# Patient Record
(demographics unavailable — no encounter records)

---

## 2025-05-09 NOTE — HISTORY OF PRESENT ILLNESS
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  The patient has difficulty with short-term memory.  He is able to get groom, cleaned it by himself.  He does go outside by himself without getting lost.  He does not do the bills, his wife does, that has always been the case.  He has difficulty with short-term memory.  Additionally, the patient has difficulty sleeping, it appears that he has poor sleep hygiene, he also does not sleep throughout the evening and sleeps several hours a night.  It is unclear if he has symptoms of a sleep apnea.

## 2025-05-09 NOTE — PHYSICAL EXAM
[Total Score ___ / 30] : the patient achieved a score of [unfilled] /30 [Date / Time ___ / 5] : date / time [unfilled] / 5 [Place ___ / 5] : place [unfilled] / 5 [Registration ___ / 3] : registration [unfilled] / 3 [Serial Sevens ___/5] : serial sevens [unfilled] / 5 [Naming 2 Objects ___ / 2] : naming two objects [unfilled] / 2 [Repeating a Sentence ___ / 1] : repeating a sentence [unfilled] / 1 [Writing a Sentence ___ / 1] : write sentence [unfilled] / 1 [3-stage Verbal Command ___ / 3] : three-stage verbal command [unfilled] / 3 [Written Command ___ / 1] : written command [unfilled] / 1 [Copy a Design ___ / 1] : copy a design [unfilled] / 1 [Recall ___ / 3] : recall [unfilled] / 3 [Cranial Nerves Optic (II)] : visual acuity intact bilaterally,  visual fields full to confrontation, pupils equal round and reactive to light [Cranial Nerves Oculomotor (III)] : extraocular motion intact [Cranial Nerves Trigeminal (V)] : facial sensation intact symmetrically [Cranial Nerves Facial (VII)] : face symmetrical [Cranial Nerves Vestibulocochlear (VIII)] : hearing was intact bilaterally [Cranial Nerves Glossopharyngeal (IX)] : tongue and palate midline [Cranial Nerves Accessory (XI - Cranial And Spinal)] : head turning and shoulder shrug symmetric [Cranial Nerves Hypoglossal (XII)] : there was no tongue deviation with protrusion [Motor Tone] : muscle tone was normal in all four extremities [Motor Strength] : muscle strength was normal in all four extremities [Involuntary Movements] : no involuntary movements were seen [Sensation Tactile Decrease] : light touch was intact [Coordination - Dysmetria Impaired Finger-to-Nose Bilateral] : not present [Coordination - Dysmetria Impaired Heel-to-Shin Bilateral] : not present [FreeTextEntry4] : Mini-Mental status examination is 19 out of 30, patient completed third grade schooling with correcting Mini-Mental status examination score of 20 out of 30 [FreeTextEntry8] : Antalgic gait

## 2025-05-09 NOTE — CONSULT LETTER
[Dear  ___] : Dear  [unfilled], [Consult Letter:] : I had the pleasure of evaluating your patient, [unfilled]. [Please see my note below.] : Please see my note below. [Consult Closing:] : Thank you very much for allowing me to participate in the care of this patient.  If you have any questions, please do not hesitate to contact me. [Sincerely,] : Sincerely, [FreeTextEntry3] : Rachael Chavarria MD, MPH

## 2025-05-09 NOTE — ASSESSMENT
[FreeTextEntry1] : aneurysms s/p SAH c/o memory problems Mini-Mental status examination is 19 out of 30, patient completed third grade schooling with correcting Mini-Mental status examination score of 20 out of 30, consistent with mild dementia range, however patient is losing significant points his attention which may be affecting multiple reasons including his sleep.  Will refer patient to sleep specialist for further evaluation.  Additionally will obtain an MRI of the brain and labs for reversible causes of dementia for further evaluation.  Patient is also complaining of balance problems, will refer to physiatry for evaluation.  I spent the time noted on the day of this patient encounter preparing for, providing and documenting the above E/M service and counseling and educate patient on differential, workup, disease course, and treatment/management. Education was provided to the patient during this encounter. All questions and concerns were answered and addressed in detail. The patient verbalized understanding and agreed to plan. Patient was advised to continue to monitor for neurologic symptoms and to notify my office or go to the nearest emergency room if there are any changes. Any orders/referrals and communications were provided as well.   Side effects of the above medications were discussed in detail including but not limited to applicable black box warning and teratogenicity as appropriate.  For patients with seizures, I discussed risk of SUDEP with patient and advised that SUDEP risk can be minimized with good seizure control through medication compliance and other measures. Patient was advised to bring previous records to my office, including CD of imaging, when applicable.

## 2025-06-04 NOTE — HISTORY OF PRESENT ILLNESS
[Home] : at home, [unfilled] , at the time of the visit. [Medical Office: ([facility])___] : at the medical office located in  [Telephone (audio)] : This telephonic visit was provided via audio only technology. [Verbal consent obtained from patient] : the patient, [unfilled] [FreeTextEntry1] : The patient has aneurysm s/o SAH s/p surgery (details unknown) 2019 and here for memory problems which started after the patient had evacuation of the subarachnoid hemorrhage. The patient has difficulty with short-term memory. He is able to get groom, cleaned it by himself. He does go outside by himself without getting lost. He does not do the bills, his wife does, that has always been the case. He has difficulty with short-term memory.  Additionally, the patient has difficulty sleeping, it appears that he has poor sleep hygiene, he also does not sleep throughout the evening and sleeps several hours a night. It is unclear if he has symptoms of a sleep apnea.  No change since last visit.

## 2025-06-04 NOTE — ASSESSMENT
[FreeTextEntry1] : aneurysms s/p SAH c/o memory problems Mini-Mental status examination is 19 out of 30, patient completed third grade schooling with correcting Mini-Mental status examination score of 20 out of 30, consistent with mild dementia range, however patient is losing significant points his attention which may be affecting multiple reasons including his sleep. Referred patient to sleep specialist for further evaluation.  MRI/A of the brain aND mrA neck shows  1.  RIGHT CAROTID NECK CIRCULATION:   Atherosclerotic plaque carotid bulb without hemodynamically significant stenosis. 2.  LEFT CAROTID NECK CIRCULATION:    Intact. 3.  VERTEBRAL NECK CIRCULATION:   Intact 4.  ANTERIOR INTRACRANIAL CIRCULATION:     Tiny saccular aneurysm has developed at the left internal carotid artery since 2019, measuring 0.4 x 0.2 cm. 5.  POSTERIOR INTRACRANIAL CIRCULATION:   Intact. 6.  BRAIN:    No evidence of acute infarction.   Right thalamic remote parenchymal hemorrhage. Ischemic white matter disease and atrophy typical for age. No recent intracranial hemorrhage is appreciated.  aneurysm, new, will refer patient to vascular neurosurgeon for further evaluation.  Advised patient and his wife the importance of observing any new severe headaches or change in mental status and neurological episodes or symptoms as this may be a sign of an aneurysm bleed and may require stat evaluation in the emergency room.  Unremarkable labs for reversible causes of dementia  Patient is also complaining of balance problems, referred pt to physiatry for evaluation.  I spent the time noted on the day of this patient encounter preparing for, providing and documenting the above E/M service and counseling and educate patient on differential, workup, disease course, and treatment/management. Education was provided to the patient during this encounter. All questions and concerns were answered and addressed in detail. The patient verbalized understanding and agreed to plan. Patient was advised to continue to monitor for neurologic symptoms and to notify my office or go to the nearest emergency room if there are any changes. Any orders/referrals and communications were provided as well.  Side effects of the above medications were discussed in detail including but not limited to applicable black box warning and teratogenicity as appropriate. For patients with seizures, I discussed risk of SUDEP with patient and advised that SUDEP risk can be minimized with good seizure control through medication compliance and other measures. Patient was advised to bring previous records to my office, including CD of imaging, when applicable.

## 2025-06-18 NOTE — HISTORY OF PRESENT ILLNESS
[FreeTextEntry1] : New cerebral aneurysm [de-identified] : Mr. Roberts is a 79M with a history of subdural hygroma s/p evacuation with bilateral blake holes at [facility] in 2019. He recently saw Dr. Chavarria for memory issues.  The patient has difficulty with short-term memory. He is able to complete the majority of his ADLS. Currently living with his wife. He had and MRI / A done which demonstrated a new left ICA aneurysm and mild ventriculomegaly. He also notes significant urinary incontinence, frequent urination as well as significant gait disturbance. He has difficulty getting up and initiating gait as well as difficulty with balance.   6/18/25 Today he is feeling well with no new complaints.  History - 2019 SDH hygromas, [facility] he had an operation done for the hygromas because the headache, dizziness didn't improve, bilateral blake holes for drainage of the subdural hematoma. Symptoms had gotten worse. Since that time worsening of memory issues.   Hx - HTN, diabetes, cholesterol

## 2025-06-18 NOTE — ASSESSMENT
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 Mild ventriculomegaly with Irwin index of roughly 0.3. He does complain of significant gait disturbance, urinary changes, and cognitive decline all on a similar time scale. Will discuss the case with Dr. Chavarria but may be reasonable to pursue further testing for NPH. Will follow up after discussion with Dr. Chavarria.   Brain aneurysm - MRA with vessel wall imaging - Discuss case at Cerebrovascular conference - Despite his age he is active and is medically stable, given this is a growin aneurysm there is a signifcaint concern as growing aneurysms have a 10-12 fold increase risk of rupture. May consider angiogram and possible treatment planning pending discussion at CV conference Discussed risks of aneurysm rupture as well as signs and symptoms of aneurysm rupture at length. Spoke with patient about going immediately to the emergency department if they were to feel worst headache of their life, severe headache, spontaneous nausea / vomiting associated with headache, weakness, LOC, or seizure. Discussed risk factors for aneurysms including family history, prior history, smoking, associated medical co-morbidities, and hypertension.

## 2025-06-18 NOTE — RESULTS/DATA
[FreeTextEntry1] : XAM: [id number] - MR BRAIN - ORDERED BY: WILLIAM GOMEZ  EXAM: [id number] - MR ANGIO NECK - ORDERED BY: WILLIAM GOMEZ  EXAM: [id number] - MR ANGIO BRAIN - ORDERED BY: WILLIAM GOMEZ   PROCEDURE DATE: 05/21/2025    INTERPRETATION: Three examinations were performed: 1. MR angiography neck circulation without gadolinium contrast 2. MR angiography intracranial circulation without gadolinium contrast 3. MR brain without gadolinium contrast   CLINICAL INFORMATION: aneurysms s/p SAH c/o memory problems; OQM Ordering Dxs: R41.3 Other amnesia / I67.1 Cerebral aneurysm, nonruptured / I60.9 Nontraumatic subarachnoid hemorrhage, unspecified  TECHNIQUE: 1. Neck circulation: MR angiography was performed from the arch to the skull base using two-dimensional time-of-flight technique. This data set was reconstructed as maximum intensity pixel images and displayed in multiple rotations. Supplemental three-dimensional acquisition centered at the carotid bifurcations was also performed, reconstructed as maximum intensity pixel images and displayed in multiple rotations. 2. Intracranial circulation: MR angiography was performed using three-dimensional time-of-flight technique. This data set was reconstructed as maximum intensity pixel images and displayed in multiple rotations. 3. Brain: Sagittal and axial T1-weighted images, axial FLAIR images, axial susceptibility weighted images, axial and coronal T2-weighted images and axial diffusion weighted images of the brain were obtained. CONTRAST: None  COMPARISON: CT head and CT angiography 2019  FINDINGS:  NECK ARTERIAL CIRCULATION  The RIGHT CAROTID circulation demonstrates an intact common carotid artery. The carotid bulb demonstrates luminal narrowing and irregularity along its posterior surface. The maximum stenosis remains less than 50%. The internal carotid artery is patent to the skull base.  The LEFT CAROTID circulation demonstrates an intact common carotid artery. The carotid bulb appears intact. The internal carotid artery is patent to the skull base.  The vertebral circulation demonstrates patent right and left vertebral arteries. The dominant caliber left vertebral artery has a small vascular loop in its post ostial present foraminal V1 segment. This is without associated stenosis. The degree of vertebral asymmetry is within physiologic variation. Each vertebral artery demonstrates near constant caliber from its origin to the foramen magnum.  INTRACRANIAL ARTERIAL CIRCULATION  The ANTERIOR circulation demonstrates intact inflow from the ascending cervical segment to the petrous segment of each internal carotid artery. The cavernous and clinoid segments of the right internal carotid artery appear intact. At the left distal clinoid segment saccular aneurysm projects left laterally and measures approximately 0.4 cm perpendicular to the vessel lumen by 0.2 cm parallel. It appears to have broad aperture with the native vessel. The ophthalmic arteries are demonstrated as patent vessels on each side. The left internal carotid arterial caliber is asymmetrically larger than the contralateral right secondary to variation of their intracranial vascular distributions. The left anterior cerebral arterial A1 segment is dominant caliber, absent on the contralateral right. An anterior communicating artery is present. This demonstrates slight lobulation without well-defined saccular aneurysm The anterior cerebral arterial A2 segments are patent to peripheral branching. The right middle cerebral artery demonstrates an intact initial M1 segment and patent peripheral anterior and posterior division sylvian branches. The left middle cerebral artery demonstrates an intact initial M1 segment and patent peripheral anterior and posterior division sylvian branches.  The POSTERIOR circulation demonstrates intact inflow from each vertebral artery. The left vertebral artery is dominant caliber. PICA artery is demonstrated on the right, not definitely identified on the left. Asymmetric left anterior inferior cerebellar artery is present. The posterior circulation is tortuous The basilar artery appears intact without stenosis. Superior cerebellar arteries are demonstrated. The left posterior cerebral artery and Posterior communicating artery tiny caliber is demonstrated on the r infundibulum. ght, not clearly seen on the left. Each posterior cerebral artery is patent to peripheral branching.  No intracranial aneurysm or arteriovenous malformation is recognized. Note that small intracranial aneurysms less than 0.5 cm may not be detected by this technique.  BRAIN  BRAIN PARENCHYMA: The brain demonstrates mild patchy indistinct lesions within the deep cerebral hemispheric white matter that suggest ischemic white matter disease. These lesions are hyperintense on the long TR images, otherwise inconspicuous. Involvement is greatest in the mid centrum semiovale white matter. Additional scattered subcortical and deeper lesions are present. No cerebral cortical lesion is identified. No diffusion restriction is found in the brain. No acute cerebral cortical infarct is found. mass effect is found in the brain.  Within the right thalamus anterior medial aspect there is cleft-like marked low signal intensity on the long TR, diffusion-weighted and susceptibility weighted images. This signal intensity abnormality is most conspicuous and appears greatest in volume on the susceptibility weighted images (blooming artifact) consistent with susceptibility effect. At this location there is no abnormal hyperintensity on the T1-weighted images. There is no associated vasogenic edema or mass effect. The appearance is consistent with hemosiderin deposition from remote brain parenchymal hemorrhage. No recent hemorrhage is suspected.  CSF SPACES: The ventricles, sulci and basal cisterns appear moderately dilated reflecting diffuse brain volume loss. There is an asymmetric small volume of residual fluid in the subarachnoid space overlying the left frontal lobe. The cerebellar tonsils extend to but not below the foramen magnum.  HEAD AND NECK STRUCTURES: The orbits are unremarkable. Paranasal sinuses are clear. The nasal cavity appears intact. The central skull base appears intact. The nasopharynx is symmetric. The temporal bones appear clear of disease. The calvarium demonstrates a left posterior vertex craniotomy blake hole defect. There is also deformity along the right lateral parietal vertex that suggests prior surgery.  ADDITIONAL FINDINGS: None   IMPRESSION:  1. RIGHT CAROTID NECK CIRCULATION: Atherosclerotic plaque carotid bulb without hemodynamically significant stenosis.  2. LEFT CAROTID NECK CIRCULATION: Intact.  3. VERTEBRAL NECK CIRCULATION: Intact  4. 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  5. POSTERIOR INTRACRANIAL CIRCULATION: Intact.  6. BRAIN: No evidence of acute infarction. Right thalamic remote parenchymal hemorrhage. Ischemic white matter disease and atrophy typical for age. No recent intracranial hemorrhage is appreciated.  --- End of Report ---  BRENDA JOY MD; Attending Radiologist This document has been electronically signed. May 23 2025 7:09AM

## 2025-07-08 NOTE — PHYSICAL EXAM
[No Acute Distress] : no acute distress [Normal Oropharynx] : normal oropharynx [Normal Appearance] : normal appearance [No Neck Mass] : no neck mass [Normal Rate/Rhythm] : normal rate/rhythm [Normal S1, S2] : normal s1, s2 [No Murmurs] : no murmurs [No Resp Distress] : no resp distress [Clear to Auscultation Bilaterally] : clear to auscultation bilaterally [No Abnormalities] : no abnormalities [Benign] : benign [Normal Gait] : normal gait [No Clubbing] : no clubbing [No Cyanosis] : no cyanosis [FROM] : FROM [1+ Pitting] : 1+ pitting [Normal Color/ Pigmentation] : normal color/ pigmentation [No Focal Deficits] : no focal deficits [Oriented x3] : oriented x3 [Normal Affect] : normal affect [TextBox_99] : uses cane

## 2025-07-08 NOTE — REVIEW OF SYSTEMS
[Fatigue] : fatigue [Edema] : edema [Anxiety] : anxiety [Obesity] : obesity [Negative] : Endocrine [TextBox_30] : See HPI above  [TextBox_119] : cerebral aneurysm, ?NPH

## 2025-07-08 NOTE — HISTORY OF PRESENT ILLNESS
[Former] : former [>= 20 pack years] : >= 20 pack years [TextBox_4] : Mr. HAYDEN is a 79 year man, former smoker (1-1.5ppd x 30y, quit 35y ago), with PMH cerebral aneurysms s/p SAH, possible NPH, memory issues, who presents to Pulmonary clinic for sleep evaluation. Referred by Dr. Chavarria. 07/08/2025 Pt has had poor sleep quality for a long time. 2019 had brain surgery; following the procedure complained of being unable to sleep well that he attributed to having too many worries on his mind. Wife at the time also had active cancer. PCP gave him ambien (5mg) for sleep. Has undergone sleep study in the past >10y ago. Results unknown.  In regards to sleep: Pt retires to bed at 11pm. Average sleep latency varies, sometimes 2h. Wakes up 9-10am. Does /not watch TV or read in bed. 2-3 nocturnal awakenings, usually for nocturia. it takes him 30 minutes to return to sleep. Takes 1 planned naps x >1h daily. The patient snores sometimes There are occasional witnessed apneas. No morning headache +poor concentration, +memory loss, +dry mouth. Does not wake up feeling unrefreshed. +weight change: 2lbs since May The patient's [place] sleepiness scale score is >10/24 (<=10 is normal). Occupation:  He denies hypnagogic hallucinations, cataplexy, sleep paralysis, vivid dreams, sleep walking, +sleep talking. Not frequent pins and needle sensations in the legs, urge to move legs, or excessive leg movements during sleep/ in the evening. No family hx of snoring or SDB. Takes ambien for poor sleep. Respiratory sx: none active. Two years ago he had a viral URI?pneumonia? and went to [facility], admitted for 6d and was discharged with supplemental O2 as well as nebulizer machine. Uses albuterol about once monthly for dyspnea. Follows with Pulmonologist Dr. Logan. Pt is deferring all sleep eval for after f/u appt with neurosurgery 7/16, seeing Dr. Logan for f/u appt next month. Has been having some b/l LE edema, PCP gave a ?diuretic yesterday and edema has been decreasing At this time, he denies anginal/pleuritic CP, SOB/THACKER, coughing, wheezing, stridor, orthopnea, hemoptysis, sick contacts, recent travel, history of frequent URIs/LRTIs, recent use of antibiotics/steroids, recent hospitalization, f/c/n/v. [TextBox_13] : 30 [YearQuit] : 35y ago [ESS] : >10 [TextBox_11] : 7

## 2025-07-08 NOTE — ASSESSMENT
[FreeTextEntry1] : Mr. HAYDEN is a 79 year man, former smoker (1-1.5ppd x 30y, quit 35y ago), with PMH cerebral aneurysms s/p SAH, possible NPH, memory issues, who presents to Pulmonary clinic for sleep evaluation. Referred by Dr. Chavarria. Pt has chronic insomnia and also is at high risk for sleep apnea. Noted at end of visit to have a pulmonologist Dr. Logan with [facility]. Discussed in full pathophys of sleep apnea and systemic effects of untreated sleep apnea (neurologic, cardiovascular, pulmonary sequelae, QOL effects) in full. Pt and wife would like to defer sleep eval until after a plan is formulated with neurosurgeon after visit next week. Advised to f/u with Dr. Logan, his pulmonologist, for further sleep eval plans. May benefit from dedicated sleep specialist visit. All questions answered. Return to clinic as needed. Pt knows to call for any interval pulmonary/sleep issues or concerns

## 2025-07-24 NOTE — PHYSICAL EXAM
[General Appearance - Alert] : alert [General Appearance - In No Acute Distress] : in no acute distress [Person] : oriented to person [Place] : oriented to place [Time] : oriented to time [Naming Objects] : no difficulty naming common objects [Fluency] : fluency intact [Comprehension] : comprehension intact [Cranial Nerves Optic (II)] : visual acuity intact bilaterally,  visual fields full to confrontation, pupils equal round and reactive to light [Cranial Nerves Oculomotor (III)] : extraocular motion intact [Cranial Nerves Trigeminal (V)] : facial sensation intact symmetrically [Cranial Nerves Facial (VII)] : face symmetrical [Motor Tone] : muscle tone was normal in all four extremities [Motor Strength] : muscle strength was normal in all four extremities [Sensation Tactile Decrease] : light touch was intact [Abnormal Walk] : normal gait [Balance] : balance was intact

## 2025-07-24 NOTE — HISTORY OF PRESENT ILLNESS
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 The patient has difficulty with short-term memory. He is able to get groom, cleaned it by himself. He does go outside by himself without getting lost. He does not do the bills, his wife does, that has always been the case. He has difficulty with short-term memory.  Additionally, the patient has difficulty sleeping, it appears that he has poor sleep hygiene, he also does not sleep throughout the evening and sleeps several hours a night. It is unclear if he has symptoms of a sleep apnea.  No change since last visit. The patient was seen by neurosurgery who thought the patient may need further evaluation for NPH.  Obtain detailed history from the patient regarding NPH symptoms.  The patient does not have urinary incontinence.  He does sometimes have urinary accidents because he is not able to get to the bathroom in time, however he has the knowledge and understanding that he needs to pee or have a bowel movement.  He does not have any trouble with ambulating specifically lifting his left THE ground.  He has had memory issues which he is being evaluated for.

## 2025-07-24 NOTE — ASSESSMENT
[FreeTextEntry1] : neurosurgery concern for NPH on imaging neurological examination is unremarkable for signs and symptoms of normal pressure hydrocephalus.  aneurysms s/p SAH c/o memory problems Mini-Mental status examination is 19 out of 30, patient completed third grade schooling with correcting Mini-Mental status examination score of 20 out of 30, consistent with mild dementia range, however patient is losing significant points his attention which may be affecting multiple reasons including his sleep. Referred patient to sleep specialist for further evaluation.  MRI/A of the brain aND mrA neck shows  1. RIGHT CAROTID NECK CIRCULATION: Atherosclerotic plaque carotid bulb without hemodynamically significant stenosis. 2. LEFT CAROTID NECK CIRCULATION: Intact. 3. VERTEBRAL NECK CIRCULATION: Intact 4. 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 5. POSTERIOR INTRACRANIAL CIRCULATION: Intact. 6. BRAIN: No evidence of acute infarction. Right thalamic remote parenchymal hemorrhage. Ischemic white matter disease and atrophy typical for age. No recent intracranial hemorrhage is appreciated.  aneurysm, new, will refer patient to vascular neurosurgeon for further evaluation. Advised patient and his wife the importance of observing any new severe headaches or change in mental status and neurological episodes or symptoms as this may be a sign of an aneurysm bleed and may require stat evaluation in the emergency room.  Unremarkable labs for reversible causes of dementia  Patient is also complaining of balance problems, referred pt to physiatry for evaluation.  I spent the time noted on the day of this patient encounter preparing for, providing and documenting the above E/M service and counseling and educate patient on differential, workup, disease course, and treatment/management. Education was provided to the patient during this encounter. All questions and concerns were answered and addressed in detail. The patient verbalized understanding and agreed to plan. Patient was advised to continue to monitor for neurologic symptoms and to notify my office or go to the nearest emergency room if there are any changes. Any orders/referrals and communications were provided as well.  Side effects of the above medications were discussed in detail including but not limited to applicable black box warning and teratogenicity as appropriate. For patients with seizures, I discussed risk of SUDEP with patient and advised that SUDEP risk can be minimized with good seizure control through medication compliance and other measures. Patient was advised to bring previous records to my office, including CD of imaging, when applicable.